# Patient Record
Sex: FEMALE | Race: WHITE | NOT HISPANIC OR LATINO | URBAN - METROPOLITAN AREA
[De-identification: names, ages, dates, MRNs, and addresses within clinical notes are randomized per-mention and may not be internally consistent; named-entity substitution may affect disease eponyms.]

---

## 2017-02-15 ENCOUNTER — APPOINTMENT (RX ONLY)
Dept: URBAN - METROPOLITAN AREA SURGERY 2 | Facility: SURGERY | Age: 42
Setting detail: DERMATOLOGY
End: 2017-02-15

## 2017-02-15 DIAGNOSIS — Z41.9 ENCOUNTER FOR PROCEDURE FOR PURPOSES OTHER THAN REMEDYING HEALTH STATE, UNSPECIFIED: ICD-10-CM

## 2017-02-15 PROCEDURE — ? FILLERS

## 2017-02-15 PROCEDURE — ? BOTOX

## 2017-02-15 NOTE — PROCEDURE: BOTOX
Detail Level: Simple
Use Map Statement For Sites (Optional): No
Perioral Units: 0
Show Price In Note?: yes
Additional Area 2 Units: 1.5
Glabellar Complex Units: 20
Map Statment: See attached map for complete details
Additional Area 1 Location: Eyebrows rhytids
Price Per Unit In $ (Use Numbers Only, No Text Please.): 17
Consent: Written consent was obtained prior to the procedure. Risks, benefits, expectations and alternatives were discussed including, but not limited to, infection, bleeding, lid/brow ptosis, bruising, swelling, diplopia, temporary effects, incomplete chemical denervation and dissatisfaction with the cosmetic outcome. No guarantee or warranty was given or implied regarding longevity of results.
Bill Summary Price Listed Below, Or Bill Total Of Units X Price Per Unit?: Bill #Units x Price Per Unit
Expiration Date (Month Year): 09/19
Postcare Instructions: Patient instructed to not lie down for 4 hours and limit physical activity for 24 hours. Patient instructed not to travel by airplane for 48 hours.\\n\\n*****
Periorbital Skin Units: 15
Administered By (Optional): Martina Villar RN
Additional Area 2 Location: Chin
Additional Area 3 Location: Eyebrow
Dilution (U/0.1 Cc): 5
Lot #: [id number]

## 2017-03-06 ENCOUNTER — APPOINTMENT (RX ONLY)
Dept: URBAN - METROPOLITAN AREA SURGERY 2 | Facility: SURGERY | Age: 42
Setting detail: DERMATOLOGY
End: 2017-03-06

## 2017-03-06 DIAGNOSIS — Z41.9 ENCOUNTER FOR PROCEDURE FOR PURPOSES OTHER THAN REMEDYING HEALTH STATE, UNSPECIFIED: ICD-10-CM

## 2017-03-06 PROCEDURE — ? FILLERS

## 2017-03-06 NOTE — PROCEDURE: FILLERS
Cheeks Filler Volume In Cc: 0
Additional Area 1 Location: Oral commissures
Filler: Juvederm Voluma XC
Additional Area 4 Location: [facility]
Price $ (Numeric Only, No Text Please.): 697
Additional Area 1 Location: Glabella
Consent: Written consent obtained. Risks include but not limited to bruising, beading, irregular texture, ulceration, infection, allergic reaction, scar formation, incomplete augmentation, temporary nature, procedural pain.
Map Statment: See attached map for complete details
Topical Anesthesia?: yes
Additional Area 1 Volume In Cc: 0.1
Cheeks Filler Volume In Cc: 0.6
Expiration Date (Month Year): 04/2018
Filler: Juvederm Volbella XC
Lot #: [id number]
Use Map Statement For Sites (Optional): No
Detail Level: Simple
Administered By (Optional): Martina Villar
Additional Area 2 Location: Druze divot
Lot #: [id number]

## 2017-04-10 ENCOUNTER — APPOINTMENT (RX ONLY)
Dept: URBAN - METROPOLITAN AREA SURGERY 2 | Facility: SURGERY | Age: 42
Setting detail: DERMATOLOGY
End: 2017-04-10

## 2017-04-10 DIAGNOSIS — Z428 OTHER AFTERCARE INVOLVING THE USE OF PLASTIC SURGERY: ICD-10-CM

## 2017-04-10 PROCEDURE — ? FILLERS

## 2017-04-10 NOTE — PROCEDURE: FILLERS
Filler: Juvederm Voluma XC
Additional Area 2 Volume In Cc: 0
Additional Area 1 Location: Perioral
Additional Area 1 Location: Oral commissures
Jawline Filler Volume In Cc: 0.2
Cheeks Filler Volume In Cc: 0.1
Administered By (Optional): Martina Villar
Consent: Written consent obtained. Risks include but not limited to bruising, beading, irregular texture, ulceration, infection, allergic reaction, scar formation, incomplete augmentation, temporary nature, procedural pain.
Additional Area 4 Location: [facility]
Lot #: [id number]
Show Price In Note?: yes
Filler: Juvederm Volbella XC
Use Map Statement For Sites (Optional): No
Detail Level: Simple
Additional Area 1 Volume In Cc: 0.3
Map Statment: See attached map for complete details

## 2017-12-22 ENCOUNTER — APPOINTMENT (RX ONLY)
Dept: URBAN - METROPOLITAN AREA CLINIC 12 | Facility: CLINIC | Age: 42
Setting detail: DERMATOLOGY
End: 2017-12-22

## 2017-12-22 DIAGNOSIS — Z41.9 ENCOUNTER FOR PROCEDURE FOR PURPOSES OTHER THAN REMEDYING HEALTH STATE, UNSPECIFIED: ICD-10-CM

## 2017-12-22 PROCEDURE — ? FILLERS

## 2017-12-22 PROCEDURE — ? BOTOX

## 2017-12-22 NOTE — PROCEDURE: BOTOX
Dilution (U/0.1 Cc): 5
Additional Area 2 Location: muscle spasm
Additional Area 3 Units: 0
Consent: Written consent was obtained prior to the procedure. Risks, benefits, expectations and alternatives were discussed including, but not limited to, infection, bleeding, lid/brow ptosis, bruising, swelling, diplopia, temporary effects, incomplete chemical denervation and dissatisfaction with the cosmetic outcome. No guarantee or warranty was given or implied regarding longevity of results.
Lot #: [id number]
Administered By (Optional): Martina Villar
Postcare Instructions: Patient instructed to not lie down for 4 hours and limit physical activity for 24 hours. Patient instructed not to travel by airplane for 48 hours.
Use Map Statement For Sites (Optional): Yes
Detail Level: Simple
Additional Area 1 Location: chin
Bill Summary Price Listed Below, Or Bill Total Of Units X Price Per Unit?: Bill #Units x Price Per Unit
Map Statment: See attached map for complete details
Additional Area 3 Location: necklace lines
Periorbital Skin Units: 15
Topical Anesthesia?: 23% lidocaine, 7% tetracaine
Expiration Date (Month Year): 08/20

## 2017-12-22 NOTE — PROCEDURE: FILLERS
Tear Troughs Filler Volume In Cc: 0.1
Marionette Lines Filler Volume In Cc: 0
Filler: Belotero
Topical Anesthetic #1: lidocaine
Map Statment: See attached map for complete details
Administered By (Optional): Martina Villar
Additional Area 4 Location: periorbital
Expiration Date (Month Year): 02/20
Filler: Shruthi Crooks ([place]-L)
Lot #: [id number]
Topical Anesthesia?: yes
Jawline Filler Volume In Cc: 0.2
Lot #: residual
Additional Area 1 Location: glabellar/nasal root lines
Topical % #2: 7
Lot #: [id number]
Additional Area 3 Location: perioral lines
Topical % #1: 23
Price $ (Numeric Only, No Text Please.): 596
Cheeks Filler Volume In Cc: 0.7
Detail Level: Simple
Topical Anesthetic Base: ointment
Additional Area 5 Location: oral commissures
Filler: Juvederm Volbella XC
Consent: Written consent obtained. Risks include but not limited to bruising, beading, irregular texture, ulceration, infection, allergic reaction, scar formation, incomplete augmentation, temporary nature, procedural pain.
Expiration Date (Month Year): 06/2018
Topical Anesthetic #2: tetracaine
Additional Area 2 Location: forehead depressions
Filler: Juvederm Ultra XC
Expiration Date (Month Year): 08/18
Price $ (Numeric Only, No Text Please.): 395
Lot #: [id number]

## 2018-01-04 ENCOUNTER — APPOINTMENT (RX ONLY)
Dept: URBAN - METROPOLITAN AREA CLINIC 12 | Facility: CLINIC | Age: 43
Setting detail: DERMATOLOGY
End: 2018-01-04

## 2018-01-04 DIAGNOSIS — Z42.8 ENCOUNTER FOR OTHER PLASTIC AND RECONSTRUCTIVE SURGERY FOLLOWING MEDICAL PROCEDURE OR HEALED INJURY: ICD-10-CM

## 2018-01-04 PROCEDURE — ? BOTOX

## 2018-01-04 NOTE — PROCEDURE: BOTOX
Administered By (Optional): Martina Villar
Forehead Units: 5
Detail Level: Simple
Bill Summary Price Listed Below, Or Bill Total Of Units X Price Per Unit?: Bill #Units x Price Per Unit
Perioral Units: 0
Postcare Instructions: Patient instructed to not lie down for 4 hours and limit physical activity for 24 hours. Patient instructed not to travel by airplane for 48 hours.
Additional Area 2 Location: muscle spasm
Additional Area 3 Location: necklace lines
Map Statment: See attached map for complete details
Use Map Statement For Sites (Optional): Yes
Expiration Date (Month Year): 03/20
Lot #: [id number]
Consent: Written consent was obtained prior to the procedure. Risks, benefits, expectations and alternatives were discussed including, but not limited to, infection, bleeding, lid/brow ptosis, bruising, swelling, diplopia, temporary effects, incomplete chemical denervation and dissatisfaction with the cosmetic outcome. No guarantee or warranty was given or implied regarding longevity of results.
Glabellar Complex Units: 10
Additional Area 1 Location: chin

## 2018-03-05 ENCOUNTER — APPOINTMENT (RX ONLY)
Dept: URBAN - METROPOLITAN AREA CLINIC 12 | Facility: CLINIC | Age: 43
Setting detail: DERMATOLOGY
End: 2018-03-05

## 2018-03-05 DIAGNOSIS — Z41.9 ENCOUNTER FOR PROCEDURE FOR PURPOSES OTHER THAN REMEDYING HEALTH STATE, UNSPECIFIED: ICD-10-CM

## 2018-03-05 PROCEDURE — ? BOTOX

## 2018-03-05 NOTE — PROCEDURE: BOTOX
Use Map Statement For Sites (Optional): Yes
Perioral Units: 0
Detail Level: Simple
Expiration Date (Month Year): October 2020
Map Statment: See attached map for complete details
Additional Area 1 Location: chin
Administered By (Optional): Martina Villar
Consent: Written consent was obtained prior to the procedure. Risks, benefits, expectations and alternatives were discussed including, but not limited to, infection, bleeding, lid/brow ptosis, bruising, swelling, diplopia, temporary effects, incomplete chemical denervation and dissatisfaction with the cosmetic outcome. No guarantee or warranty was given or implied regarding longevity of results.
Periorbital Skin Units: 7.5
Price Per Unit In $ (Use Numbers Only, No Text Please.): 17
Lot #: [id number]
Bill Summary Price Listed Below, Or Bill Total Of Units X Price Per Unit?: Bill #Units x Price Per Unit
Glabellar Complex Units: 12.5
Additional Area 3 Location: tail end of brows
Postcare Instructions: Patient instructed to not lie down for 4 hours and limit physical activity for 24 hours. Patient instructed not to travel by airplane for 48 hours.
Dilution (U/0.1 Cc): 5
Additional Area 2 Location: nasalis depressor

## 2018-05-18 ENCOUNTER — APPOINTMENT (RX ONLY)
Dept: URBAN - METROPOLITAN AREA CLINIC 12 | Facility: CLINIC | Age: 43
Setting detail: DERMATOLOGY
End: 2018-05-18

## 2018-05-18 DIAGNOSIS — Z41.9 ENCOUNTER FOR PROCEDURE FOR PURPOSES OTHER THAN REMEDYING HEALTH STATE, UNSPECIFIED: ICD-10-CM

## 2018-05-18 PROCEDURE — ? BOTOX

## 2018-05-18 PROCEDURE — ? FILLERS

## 2018-05-18 NOTE — PROCEDURE: FILLERS
Topical % #2: 7
Lower Lips Filler Volume In Cc: 0
Use Map Statement For Sites (Optional): Yes
Additional Area 1 Location: shadow upper lip
Additional Area 1 Location: glabellar/nasal root lines
Topical Anesthetic #1: lidocaine
Administered By (Optional): Martina Villar RN
Upper Lip Filler Volume In Cc: 0.1
Lot #: residual
Additional Area 2 Location: forehead depressions
Topical Anesthetic #2: tetracaine
Map Statment: See attached map for complete details
Additional Area 3 Location: perioral lines
Additional Area 2 Location: glabellar line
Topical % #1: 23
Additional Area 4 Location: chin
Consent: Written consent obtained. Risks include but not limited to bruising, beading, irregular texture, ulceration, infection, allergic reaction, scar formation, incomplete augmentation, temporary nature, procedural pain.
Filler: Juvederm Ultra XC
Additional Area 3 Location: glabellar scar and perioral line
Additional Area 5 Location: oral commissures
Topical Anesthetic Base: ointment
Additional Area 4 Location: cheek depressions
Detail Level: Simple

## 2018-05-18 NOTE — PROCEDURE: BOTOX
Additional Area 3 Location: tail end of brows
Additional Area 1 Location: chin
Lot #: [id number]
Administered By (Optional): Martina Villar
Postcare Instructions: Patient instructed to not lie down for 4 hours and limit physical activity for 24 hours. Patient instructed not to travel by airplane for 48 hours.
Additional Area 2 Location: nasalis depressor
Periorbital Skin Units: 15
Dilution (U/0.1 Cc): 5
Bill Summary Price Listed Below, Or Bill Total Of Units X Price Per Unit?: Bill #Units x Price Per Unit
Glabellar Complex Units: 12.5
Additional Area 1 Units: 0
Platsyma Units: 20
Consent: Written consent was obtained prior to the procedure. Risks, benefits, expectations and alternatives were discussed including, but not limited to, infection, bleeding, lid/brow ptosis, bruising, swelling, diplopia, temporary effects, incomplete chemical denervation and dissatisfaction with the cosmetic outcome. No guarantee or warranty was given or implied regarding longevity of results.
Use Map Statement For Sites (Optional): Yes
Expiration Date (Month Year): 12/2020
Map Statment: See attached map for complete details
Detail Level: Simple
Forehead Units: 7.5

## 2018-06-01 ENCOUNTER — APPOINTMENT (RX ONLY)
Dept: URBAN - METROPOLITAN AREA CLINIC 12 | Facility: CLINIC | Age: 43
Setting detail: DERMATOLOGY
End: 2018-06-01

## 2018-06-01 DIAGNOSIS — Z42.8 ENCOUNTER FOR OTHER PLASTIC AND RECONSTRUCTIVE SURGERY FOLLOWING MEDICAL PROCEDURE OR HEALED INJURY: ICD-10-CM

## 2018-06-01 PROCEDURE — 99024 POSTOP FOLLOW-UP VISIT: CPT

## 2018-06-01 PROCEDURE — ? CODE 99024 - NO CHARGE POST-OP VISIT

## 2018-08-06 ENCOUNTER — APPOINTMENT (RX ONLY)
Dept: URBAN - METROPOLITAN AREA CLINIC 12 | Facility: CLINIC | Age: 43
Setting detail: DERMATOLOGY
End: 2018-08-06

## 2018-08-06 DIAGNOSIS — Z41.9 ENCOUNTER FOR PROCEDURE FOR PURPOSES OTHER THAN REMEDYING HEALTH STATE, UNSPECIFIED: ICD-10-CM

## 2018-08-06 PROCEDURE — ? BOTOX

## 2018-08-06 PROCEDURE — ? DYSPORT

## 2018-08-06 NOTE — PROCEDURE: DYSPORT
Detail Level: Detailed
Postcare Instructions: Patient instructed to not lie down for 4 hours and limit physical activity for 24 hours. Patient instructed not to travel by airplane for 48 hours.
Additional Area 1 Location: platysma bands
Additional Area 1 Units: 75
Map Statment: See attached map for complete details
Glabellar Complex Units: 0
Lot #: [id number]
Expiration Date (Month Year): 09/2018
Consent: Written consent was obtained prior to the procedure. Risks, benefits, expectations and alternatives were discussed including, but not limited to, infection, bleeding, lid/brow ptosis, bruising, swelling, diplopia, temporary effects, incomplete chemical denervation and dissatisfaction with the cosmetic outcome. No guarantee or warranty was given or implied regarding longevity of results.
Bill Summary Price Listed Below, Or Bill Total Of Units X Price Per Unit?: Bill Summary Price Below
Summary Price $ (Use Numbers Only, No Text Please.): 375
Dilution (U/ 0.1cc): 5
Use Map Statement For Sites (Optional): No

## 2018-08-06 NOTE — PROCEDURE: BOTOX
Forehead Units: 5
Additional Area 1 Location: chin
Administered By (Optional): Martina Villar
Periorbital Skin Units: 15
Additional Area 3 Units: 0
Detail Level: Simple
Lot #: [id number]
Show Price In Note?: yes
Expiration Date (Month Year): 11/2020
Additional Area 2 Location: tail end of brow
Bill Summary Price Listed Below, Or Bill Total Of Units X Price Per Unit?: Bill #Units x Price Per Unit
Additional Area 3 Location: nasalis depressor
Price Per Unit In $ (Use Numbers Only, No Text Please.): 16
Map Statment: See attached map for complete details
Consent: Written consent was obtained prior to the procedure. Risks, benefits, expectations and alternatives were discussed including, but not limited to, infection, bleeding, lid/brow ptosis, bruising, swelling, diplopia, temporary effects, incomplete chemical denervation and dissatisfaction with the cosmetic outcome. No guarantee or warranty was given or implied regarding longevity of results.
Postcare Instructions: Patient instructed to not lie down for 4 hours and limit physical activity for 24 hours. Patient instructed not to travel by airplane for 48 hours.
Glabellar Complex Units: 10

## 2018-11-05 ENCOUNTER — APPOINTMENT (RX ONLY)
Dept: URBAN - METROPOLITAN AREA CLINIC 12 | Facility: CLINIC | Age: 43
Setting detail: DERMATOLOGY
End: 2018-11-05

## 2018-11-05 DIAGNOSIS — Z41.9 ENCOUNTER FOR PROCEDURE FOR PURPOSES OTHER THAN REMEDYING HEALTH STATE, UNSPECIFIED: ICD-10-CM

## 2018-11-05 PROCEDURE — ? BOTOX

## 2018-11-05 NOTE — PROCEDURE: BOTOX
Additional Area 3 Units: 0
Additional Area 2 Location: tail end of brow
Show Price In Note?: yes
Dilution (U/0.1 Cc): 5
Price Per Unit In $ (Use Numbers Only, No Text Please.): 17
Periorbital Skin Units: 15
Additional Area 3 Location: nasalis depressor
Detail Level: Simple
Administered By (Optional): Martina Villar
Glabellar Complex Units: 10
Bill Summary Price Listed Below, Or Bill Total Of Units X Price Per Unit?: Bill #Units x Price Per Unit
Lot #: [id number]
Postcare Instructions: Patient instructed to not lie down for 4 hours and limit physical activity for 24 hours. Patient instructed not to travel by airplane for 48 hours.
Additional Area 1 Location: [facility]
Map Statment: See attached map for complete details
Consent: Written consent was obtained prior to the procedure. Risks, benefits, expectations and alternatives were discussed including, but not limited to, infection, bleeding, lid/brow ptosis, bruising, swelling, diplopia, temporary effects, incomplete chemical denervation and dissatisfaction with the cosmetic outcome. No guarantee or warranty was given or implied regarding longevity of results.
Expiration Date (Month Year): 05/21

## 2018-11-27 ENCOUNTER — APPOINTMENT (RX ONLY)
Dept: URBAN - METROPOLITAN AREA CLINIC 12 | Facility: CLINIC | Age: 43
Setting detail: DERMATOLOGY
End: 2018-11-27

## 2018-11-27 DIAGNOSIS — Z41.1 ENCOUNTER FOR COSMETIC SURGERY: ICD-10-CM

## 2018-11-27 DIAGNOSIS — Z41.9 ENCOUNTER FOR PROCEDURE FOR PURPOSES OTHER THAN REMEDYING HEALTH STATE, UNSPECIFIED: ICD-10-CM

## 2018-11-27 DIAGNOSIS — Z42.8 ENCOUNTER FOR OTHER PLASTIC AND RECONSTRUCTIVE SURGERY FOLLOWING MEDICAL PROCEDURE OR HEALED INJURY: ICD-10-CM

## 2018-11-27 PROCEDURE — ? CODE 99024 - NO CHARGE POST-OP VISIT

## 2018-11-27 PROCEDURE — ? COSMETIC CONSULTATION: THERMI

## 2018-11-27 PROCEDURE — ? PATIENT SPECIFIC COUNSELING

## 2018-11-27 PROCEDURE — 99024 POSTOP FOLLOW-UP VISIT: CPT

## 2018-11-27 PROCEDURE — ? FILLERS

## 2018-11-27 ASSESSMENT — LOCATION ZONE DERM: LOCATION ZONE: FACE

## 2018-11-27 ASSESSMENT — LOCATION SIMPLE DESCRIPTION DERM: LOCATION SIMPLE: SUBMENTAL CHIN

## 2018-11-27 ASSESSMENT — LOCATION DETAILED DESCRIPTION DERM: LOCATION DETAILED: SUBMENTAL CHIN

## 2018-11-27 NOTE — PROCEDURE: PATIENT SPECIFIC COUNSELING
Other (Free Text): Patient presents for retalk neck consultation. Patient states that she had liposuction and thermi done a few years back with Dr. Meyer and states that the skin on her neck has become loose again. Dr. Meyer examined her neck and recommended that he does thermi once again to help tighten her neck and define her jaw line.  Patient verbalized understanding and agreed to have the procedure. Dr. Meyer wrote up the quote and presented it to the patient.
Detail Level: Zone

## 2018-11-27 NOTE — PROCEDURE: FILLERS
Map Statment: See attached map for complete details
Consent: Written consent obtained. Risks include but not limited to bruising, beading, irregular texture, ulceration, infection, allergic reaction, scar formation, incomplete augmentation, temporary nature, procedural pain.
Show Price In Note?: yes
Topical % #1: 23
Mid Face Filler Volume In Cc: 0
Expiration Date (Month Year): 12/19
Price $ (Numeric Only, No Text Please.): 936
Additional Area 3 Location: glabellar scar and perioral line
Topical Anesthesia?: no
Tear Troughs Filler Volume In Cc: 0.1
Lot #: [id number]
Additional Area 1 Location: glabellar/nasal root lines
Additional Area 3 Location: perioral lines
Topical % #2: 7
Additional Area 5 Location: oral commissures
Additional Area 1 Location: horizontal perioral line
Additional Area 2 Location: forehead depressions
Additional Area 4 Location: necklace lines
Detail Level: Simple
Topical Anesthetic #1: lidocaine
Filler: Belotero
Administered By (Optional): Martina Villar RN
Additional Area 2 Location: glabellar line
Topical Anesthetic #2: tetracaine
Additional Area 4 Location: cheek depressions
Topical Anesthetic Base: ointment

## 2019-02-05 ENCOUNTER — APPOINTMENT (RX ONLY)
Dept: URBAN - METROPOLITAN AREA CLINIC 12 | Facility: CLINIC | Age: 44
Setting detail: DERMATOLOGY
End: 2019-02-05

## 2019-02-05 DIAGNOSIS — Z41.9 ENCOUNTER FOR PROCEDURE FOR PURPOSES OTHER THAN REMEDYING HEALTH STATE, UNSPECIFIED: ICD-10-CM

## 2019-02-05 PROCEDURE — ? FILLERS

## 2019-02-05 PROCEDURE — ? BOTOX

## 2019-02-05 NOTE — PROCEDURE: BOTOX
Use Map Statement For Sites (Optional): Yes
Platsyma Units: 0
Administered By (Optional): Martina Villar
Additional Area 3 Location: necklace lines
Lot #: [id number]
Additional Area 2 Units: 5
Postcare Instructions: Patient instructed to not lie down for 4 hours and limit physical activity for 24 hours. Patient instructed not to travel by airplane for 48 hours.
Bill Summary Price Listed Below, Or Bill Total Of Units X Price Per Unit?: Bill #Units x Price Per Unit
Topical Anesthesia?: 23% lidocaine, 7% tetracaine
Additional Area 1 Location: [facility]
Price Per Unit In $ (Use Numbers Only, No Text Please.): 17
Consent: Written consent was obtained prior to the procedure. Risks, benefits, expectations and alternatives were discussed including, but not limited to, infection, bleeding, lid/brow ptosis, bruising, swelling, diplopia, temporary effects, incomplete chemical denervation and dissatisfaction with the cosmetic outcome. No guarantee or warranty was given or implied regarding longevity of results.
Additional Area 2 Location: Chin
Map Statment: See attached map for complete details
Detail Level: Simple
Expiration Date (Month Year): 08/2021

## 2019-05-20 ENCOUNTER — APPOINTMENT (RX ONLY)
Dept: URBAN - METROPOLITAN AREA CLINIC 12 | Facility: CLINIC | Age: 44
Setting detail: DERMATOLOGY
End: 2019-05-20

## 2019-05-20 DIAGNOSIS — Z41.9 ENCOUNTER FOR PROCEDURE FOR PURPOSES OTHER THAN REMEDYING HEALTH STATE, UNSPECIFIED: ICD-10-CM

## 2019-05-20 PROCEDURE — ? FILLERS

## 2019-05-20 PROCEDURE — ? BOTOX

## 2019-05-20 NOTE — PROCEDURE: FILLERS
Topical Anesthetic #1: lidocaine
Upper Lip Filler Volume In Cc: 0
Additional Area 2 Location: eyelid cheek margin
Topical Anesthesia?: yes
Administered By (Optional): Martina Villar RN
Filler: Belotero
Additional Area 3 Location: smile line
Marionette Lines Filler Volume In Cc: 0.1
Additional Area 3 Location: perioral lines
Additional Area 1 Location: glabellar/nasal root lines
Topical % #2: 7
Additional Area 4 Location: cheek depressions
Topical Anesthetic Base: ointment
Additional Area 4 Location: necklace lines
Additional Area 5 Location: frontalis rhytids
Consent: Written consent obtained. Risks include but not limited to bruising, beading, irregular texture, ulceration, infection, allergic reaction, scar formation, incomplete augmentation, temporary nature, procedural pain.
Topical Anesthetic #2: tetracaine
Additional Area 5 Location: oral commissures
Detail Level: Simple
Topical % #1: 23
Lot #: residual
Additional Area 2 Location: glabellar line
Map Statment: See attached map for complete details

## 2019-05-20 NOTE — PROCEDURE: BOTOX
Additional Area 3 Location: necklace lines
Lot #: [id number]
Platsyma Units: 0
Consent: Written consent was obtained prior to the procedure. Risks, benefits, expectations and alternatives were discussed including, but not limited to, infection, bleeding, lid/brow ptosis, bruising, swelling, diplopia, temporary effects, incomplete chemical denervation and dissatisfaction with the cosmetic outcome. No guarantee or warranty was given or implied regarding longevity of results.
Glabellar Complex Units: 10
Show Price In Note?: yes
Forehead Units: 5
Detail Level: Simple
Additional Area 1 Location: in the brow
Bill Summary Price Listed Below, Or Bill Total Of Units X Price Per Unit?: Bill #Units x Price Per Unit
Map Statment: See attached map for complete details
Expiration Date (Month Year): 08/2021
Additional Area 2 Location: Chin
Administered By (Optional): Martina Villar
Postcare Instructions: Patient instructed to not lie down for 4 hours and limit physical activity for 24 hours. Patient instructed not to travel by airplane for 48 hours.
Price Per Unit In $ (Use Numbers Only, No Text Please.): 17
Periorbital Skin Units: 15

## 2019-06-17 ENCOUNTER — APPOINTMENT (RX ONLY)
Dept: URBAN - METROPOLITAN AREA CLINIC 12 | Facility: CLINIC | Age: 44
Setting detail: DERMATOLOGY
End: 2019-06-17

## 2019-06-17 DIAGNOSIS — Z41.1 ENCOUNTER FOR COSMETIC SURGERY: ICD-10-CM

## 2019-06-17 DIAGNOSIS — Z42.8 ENCOUNTER FOR OTHER PLASTIC AND RECONSTRUCTIVE SURGERY FOLLOWING MEDICAL PROCEDURE OR HEALED INJURY: ICD-10-CM

## 2019-06-17 PROCEDURE — ? PATIENT SPECIFIC COUNSELING

## 2019-06-17 PROCEDURE — ? BOTOX

## 2019-06-17 PROCEDURE — ? CONSULTATION - AGING FACE

## 2019-06-17 ASSESSMENT — LOCATION SIMPLE DESCRIPTION DERM: LOCATION SIMPLE: LEFT CHEEK

## 2019-06-17 ASSESSMENT — LOCATION DETAILED DESCRIPTION DERM: LOCATION DETAILED: LEFT LATERAL BUCCAL CHEEK

## 2019-06-17 ASSESSMENT — LOCATION ZONE DERM: LOCATION ZONE: FACE

## 2019-06-17 NOTE — PROCEDURE: PATIENT SPECIFIC COUNSELING
Other (Free Text): Patient presents for consultation lower face lift. Patient states that she is unhappy with her jowls and would like to discuss If revising her neck is needed. Dr. Meyer examined her face and explained that her neck is holding up very well, however, she is a great candidate for a short scar face lift. Dr. Meyer educated patient on where her scars will be. Dr. Meyer mentioned possibly doing a little fat grafting to her cheeks just to add a little volume. Dr. Meyer recommended that she spend 1 night post surgery to help with recovery. Patient was informed that she will go home with a face mask that she will need to wear 22 out of 24 hrs a day. Patient verbalized understanding. \\n\\nPatient was advised that a quote will be emailed to her.
Detail Level: Zone

## 2019-06-17 NOTE — PROCEDURE: BOTOX
Administered By (Optional): Martina Villar
Additional Area 1 Location: in the brow
Additional Area 1 Units: 0
Map Statment: See attached map for complete details
Expiration Date (Month Year): 10/2021
Lot #: [id number]
Additional Area 2 Location: Chin
Show Price In Note?: yes
Consent: Written consent was obtained prior to the procedure. Risks, benefits, expectations and alternatives were discussed including, but not limited to, infection, bleeding, lid/brow ptosis, bruising, swelling, diplopia, temporary effects, incomplete chemical denervation and dissatisfaction with the cosmetic outcome. No guarantee or warranty was given or implied regarding longevity of results.
Postcare Instructions: Patient instructed to not lie down for 4 hours and limit physical activity for 24 hours. Patient instructed not to travel by airplane for 48 hours.
Detail Level: Simple
Dilution (U/0.1 Cc): 5
Additional Area 3 Location: necklace lines
Glabellar Complex Units: 1
Bill Summary Price Listed Below, Or Bill Total Of Units X Price Per Unit?: Bill #Units x Price Per Unit

## 2019-10-02 ENCOUNTER — APPOINTMENT (RX ONLY)
Dept: URBAN - METROPOLITAN AREA CLINIC 12 | Facility: CLINIC | Age: 44
Setting detail: DERMATOLOGY
End: 2019-10-02

## 2019-10-02 DIAGNOSIS — Z41.9 ENCOUNTER FOR PROCEDURE FOR PURPOSES OTHER THAN REMEDYING HEALTH STATE, UNSPECIFIED: ICD-10-CM

## 2019-10-02 PROCEDURE — ? BOTOX

## 2019-10-02 NOTE — PROCEDURE: BOTOX
Additional Area 2 Location: Chin
Detail Level: Simple
Tom Units: 0
Bill Summary Price Listed Below, Or Bill Total Of Units X Price Per Unit?: Bill #Units x Price Per Unit
Map Statment: See attached map for complete details
Dilution (U/0.1 Cc): 5
Glabellar Complex Units: 7.5
Show Price In Note?: yes
Additional Area 1 Location: in eyebrows
Lot #: [id number]
Consent: Written consent was obtained prior to the procedure. Risks, benefits, expectations and alternatives were discussed including, but not limited to, infection, bleeding, lid/brow ptosis, bruising, swelling, diplopia, temporary effects, incomplete chemical denervation and dissatisfaction with the cosmetic outcome. No guarantee or warranty was given or implied regarding longevity of results.
Additional Area 3 Location: necklace lines
Administered By (Optional): Martina Villar
Postcare Instructions: Patient instructed to not lie down for 4 hours and limit physical activity for 24 hours. Patient instructed not to travel by airplane for 48 hours.
Price Per Unit In $ (Use Numbers Only, No Text Please.): 17
Periorbital Skin Units: 15
Expiration Date (Month Year): 02/2022

## 2019-10-23 ENCOUNTER — APPOINTMENT (RX ONLY)
Dept: URBAN - METROPOLITAN AREA CLINIC 12 | Facility: CLINIC | Age: 44
Setting detail: DERMATOLOGY
End: 2019-10-23

## 2019-10-23 DIAGNOSIS — Z41.1 ENCOUNTER FOR COSMETIC SURGERY: ICD-10-CM

## 2019-10-23 DIAGNOSIS — Z42.8 ENCOUNTER FOR OTHER PLASTIC AND RECONSTRUCTIVE SURGERY FOLLOWING MEDICAL PROCEDURE OR HEALED INJURY: ICD-10-CM

## 2019-10-23 PROCEDURE — ? CODE 99024 - NO CHARGE POST-OP VISIT

## 2019-10-23 PROCEDURE — 99024 POSTOP FOLLOW-UP VISIT: CPT

## 2020-01-15 ENCOUNTER — APPOINTMENT (RX ONLY)
Dept: URBAN - METROPOLITAN AREA CLINIC 12 | Facility: CLINIC | Age: 45
Setting detail: DERMATOLOGY
End: 2020-01-15

## 2020-01-15 DIAGNOSIS — Z41.9 ENCOUNTER FOR PROCEDURE FOR PURPOSES OTHER THAN REMEDYING HEALTH STATE, UNSPECIFIED: ICD-10-CM

## 2020-01-15 PROCEDURE — ? BOTOX

## 2020-01-15 PROCEDURE — ? FILLERS

## 2020-01-15 NOTE — PROCEDURE: BOTOX
Additional Area 3 Location: in the brow
Additional Area 3 Units: 0
Expiration Date (Month Year): 05/2022
Price Per Unit In $ (Use Numbers Only, No Text Please.): 17
Glabellar Complex Units: 10
Use Map Statement For Sites (Optional): Yes
Forehead Units: 5
Detail Level: Simple
Lot #: [id number]
Additional Area 1 Location: sneer muscles
Topical Anesthesia?: 23% lidocaine, 7% tetracaine
Postcare Instructions: Patient instructed to not lie down for 4 hours and limit physical activity for 24 hours. Patient instructed not to travel by airplane for 48 hours.
Consent: Written consent was obtained prior to the procedure. Risks, benefits, expectations and alternatives were discussed including, but not limited to, infection, bleeding, lid/brow ptosis, bruising, swelling, diplopia, temporary effects, incomplete chemical denervation and dissatisfaction with the cosmetic outcome. No guarantee or warranty was given or implied regarding longevity of results.
Map Statment: See attached map for complete details
Administered By (Optional): Martina Villar
Additional Area 2 Location: Chin
Periorbital Skin Units: 12.5
Bill Summary Price Listed Below, Or Bill Total Of Units X Price Per Unit?: Bill #Units x Price Per Unit

## 2020-01-31 ENCOUNTER — APPOINTMENT (RX ONLY)
Dept: URBAN - METROPOLITAN AREA CLINIC 12 | Facility: CLINIC | Age: 45
Setting detail: DERMATOLOGY
End: 2020-01-31

## 2020-01-31 DIAGNOSIS — Z41.9 ENCOUNTER FOR PROCEDURE FOR PURPOSES OTHER THAN REMEDYING HEALTH STATE, UNSPECIFIED: ICD-10-CM

## 2020-01-31 DIAGNOSIS — Z42.8 ENCOUNTER FOR OTHER PLASTIC AND RECONSTRUCTIVE SURGERY FOLLOWING MEDICAL PROCEDURE OR HEALED INJURY: ICD-10-CM

## 2020-01-31 PROCEDURE — ? CODE 99024 - NO CHARGE POST-OP VISIT

## 2020-01-31 PROCEDURE — ? PATIENT SPECIFIC COUNSELING

## 2020-01-31 PROCEDURE — 99024 POSTOP FOLLOW-UP VISIT: CPT

## 2020-01-31 NOTE — PROCEDURE: PATIENT SPECIFIC COUNSELING
Other (Free Text): Patient presents today c/o hollow temples. She had been treated with Fillers and Botox injections in the past by Martina Villar. Dr. Meyer examined the patient and stated that her temple hollows is very mild, and he would recommend to reassess  this area in the next year or so. \\n\\nThe patient states she was interested in discussing other aging face options, other than a face lift. Dr. Meyer stated that she would be a great candidate for Ulthera around her lower face and jowls. He explained it takes about an hour, and has no incision or down time. Patient was given a quote today.
Detail Level: Simple

## 2020-07-08 ENCOUNTER — APPOINTMENT (RX ONLY)
Dept: URBAN - METROPOLITAN AREA CLINIC 12 | Facility: CLINIC | Age: 45
Setting detail: DERMATOLOGY
End: 2020-07-08

## 2020-07-08 DIAGNOSIS — Z41.9 ENCOUNTER FOR PROCEDURE FOR PURPOSES OTHER THAN REMEDYING HEALTH STATE, UNSPECIFIED: ICD-10-CM

## 2020-07-08 PROCEDURE — ? BOTOX

## 2020-07-08 NOTE — PROCEDURE: BOTOX
Platsyma Units: 0
Additional Area 3 Location: necklace lines
Consent: Written consent was obtained prior to the procedure. Risks, benefits, expectations and alternatives were discussed including, but not limited to, infection, bleeding, lid/brow ptosis, bruising, swelling, diplopia, temporary effects, incomplete chemical denervation and dissatisfaction with the cosmetic outcome. No guarantee or warranty was given or implied regarding longevity of results.
Administered By (Optional): Martina Villar
Additional Area 2 Location: Chin
Expiration Date (Month Year): 01/2023
Periorbital Skin Units: 15
Price Per Unit In $ (Use Numbers Only, No Text Please.): 17
Detail Level: Simple
Bill Summary Price Listed Below, Or Bill Total Of Units X Price Per Unit?: Bill #Units x Price Per Unit
Postcare Instructions: Patient instructed to not lie down for 4 hours and limit physical activity for 24 hours. Patient instructed not to travel by airplane for 48 hours.
Additional Area 1 Location: in the brow
Dilution (U/0.1 Cc): 5
Map Statment: See attached map for complete details
Lot #: [id number]
Forehead Units: 7.5
Show Price In Note?: yes
Glabellar Complex Units: 12.5